# Patient Record
Sex: FEMALE | URBAN - NONMETROPOLITAN AREA
[De-identification: names, ages, dates, MRNs, and addresses within clinical notes are randomized per-mention and may not be internally consistent; named-entity substitution may affect disease eponyms.]

---

## 2021-02-20 ENCOUNTER — NURSE TRIAGE (OUTPATIENT)
Dept: CALL CENTER | Facility: HOSPITAL | Age: 56
End: 2021-02-20

## 2021-02-21 NOTE — TELEPHONE ENCOUNTER
"States her father has shingles and they want to know if it is contagious.     Reason for Disposition  • Shingles, questions about    Additional Information  • Negative: Difficult to awaken or acting confused (e.g., disoriented, slurred speech)  • Negative: Sounds like a life-threatening emergency to the triager  • Negative: [1] Localized rash AND [2] doesn't match the SYMPTOMS of shingles  • Negative: [1] Back pain AND [2] doesn't match the SYMPTOMS of shingles  • Negative: Shingles Vaccine (Recombinant Zoster Vaccine; RZV; Shingrix), questions about  • Negative: Patient sounds very sick or weak to the triager  • Negative: [1] Shingles rash (matches SYMPTOMS) AND [2] weak immune system (e.g., HIV positive,  cancer chemotherapy, chronic steroid treatment, splenectomy) AND [3] NOT taking antiviral medication  • Negative: Shingles rash on the eyelid or tip of the nose  • Negative: [1] Shingles rash of face AND [2] eye pain or blurred vision  • Negative: [1] Shingles rash of face AND [2] facial weakness  • Negative: [1] Shingles rash of face or ear AND [2] earache or ringing in the ear  • Negative: [1] Shingles rash AND [2] spots start appearing other places on body  • Negative: Fever > 100.4 F (38.0 C)  • Negative: SEVERE pain (e.g., excruciating)  • Negative: [1] Shingles rash (matches SYMPTOMS) AND [2] onset within past 72 hours  • Negative: [1] Shingles rash AND [2] onset > 72 hours ago  • Negative: [1] Looks infected (spreading redness, pus) AND [2] no fever  • Negative: [1] Shingle rash already diagnosed AND [2] weak immune system (e.g., HIV positive,  cancer chemotherapy, chronic steroid treatment, splenectomy) AND [3]  taking antiviral medication  • Negative: Pain persisting > 1 month after rash disappears  • Negative: [1] Shingles rash already diagnosed and [2] taking antiviral medication    Answer Assessment - Initial Assessment Questions  1. APPEARANCE of RASH: \"Describe the rash.\"       See note  2. " "LOCATION: \"Where is the rash located?\"       n/a  3. ONSET: \"When did the rash start?\"       n/a  4. ITCHING: \"Does the rash itch?\" If so, ask: \"How bad is the itch?\"  (Scale 1-10; or mild, moderate, severe)      n/a  5. PAIN: \"Does the rash hurt?\" If so, ask: \"How bad is the pain?\"  (Scale 1-10; or mild, moderate, severe)      n/a  6. OTHER SYMPTOMS: \"Do you have any other symptoms?\" (e.g., fever)      n/a  7. PREGNANCY: \"Is there any chance you are pregnant?\" \"When was your last menstrual period?\"      n/a    Protocols used: SHINGLES-ADULT-      "